# Patient Record
Sex: FEMALE | Race: BLACK OR AFRICAN AMERICAN | Employment: FULL TIME | URBAN - METROPOLITAN AREA
[De-identification: names, ages, dates, MRNs, and addresses within clinical notes are randomized per-mention and may not be internally consistent; named-entity substitution may affect disease eponyms.]

---

## 2017-04-18 ENCOUNTER — HOSPITAL ENCOUNTER (EMERGENCY)
Age: 32
Discharge: HOME | End: 2017-04-18
Attending: EMERGENCY MEDICINE
Payer: MEDICAID

## 2017-04-18 VITALS
DIASTOLIC BLOOD PRESSURE: 82 MMHG | SYSTOLIC BLOOD PRESSURE: 132 MMHG | OXYGEN SATURATION: 100 % | RESPIRATION RATE: 18 BRPM | HEART RATE: 66 BPM | TEMPERATURE: 98.5 F

## 2017-04-18 DIAGNOSIS — S46.912A SHOULDER STRAIN, LEFT, INITIAL ENCOUNTER: Primary | ICD-10-CM

## 2017-04-18 PROCEDURE — 99282 EMERGENCY DEPT VISIT SF MDM: CPT

## 2017-04-18 RX ORDER — IBUPROFEN 800 MG/1
TABLET ORAL
Qty: 20 TAB | Refills: 0 | Status: SHIPPED | OUTPATIENT
Start: 2017-04-18 | End: 2019-05-16

## 2017-04-18 NOTE — ED PROVIDER NOTES
HPI Comments: 7:22 AM Raghavendra Daniel is a 28 y.o. female with no pertinent medical history, presents to the ED with complaints of left shoulder pain  that began last night. She states that she believes she \"pulled a muscle\" while she was at work, since she does heavy lifting. She denies any fever, nausea, vomiting, diarrhea, cough, SOB, chest pain, back pain, and/or rash. She states that she can not raise her arm, and she has had this happen before approximately a year ago. She was informed a year ago that she had pulled a muscle then. She denies taking any pain medications. No further symptoms or complaints expressed at this time. The history is provided by the patient. Past Medical History:   Diagnosis Date    Anemia NEC     Postpartum depression     Suicidal thoughts        History reviewed. No pertinent surgical history. Family History:   Problem Relation Age of Onset    Kidney Disease Mother     Sickle Cell Anemia Brother        Social History     Social History    Marital status: SINGLE     Spouse name: N/A    Number of children: N/A    Years of education: N/A     Occupational History    Not on file. Social History Main Topics    Smoking status: Current Every Day Smoker     Packs/day: 1.00     Years: 10.00    Smokeless tobacco: Never Used    Alcohol use Yes      Comment: weekly    Drug use: No    Sexual activity: Yes     Partners: Male     Birth control/ protection: Condom     Other Topics Concern    Not on file     Social History Narrative         ALLERGIES: Review of patient's allergies indicates no known allergies. Review of Systems   Constitutional: Negative for fever. Respiratory: Negative for cough and shortness of breath. Cardiovascular: Negative for chest pain. Gastrointestinal: Negative for diarrhea, nausea and vomiting. Musculoskeletal: Positive for arthralgias (left shoulder pain). Negative for back pain. Skin: Negative for rash. All other systems reviewed and are negative. Vitals:    04/18/17 0634   BP: 132/82   Pulse: 66   Resp: 18   Temp: 98.5 °F (36.9 °C)   SpO2: 100%            Physical Exam   Constitutional: She is oriented to person, place, and time. She appears well-developed and well-nourished. No distress. HENT:   Head: Normocephalic and atraumatic. Right Ear: External ear normal.   Left Ear: External ear normal.   Nose: Nose normal.   Mouth/Throat: Uvula is midline, oropharynx is clear and moist and mucous membranes are normal.   Eyes: Conjunctivae are normal. No scleral icterus. Neck: Neck supple. Cardiovascular: Normal rate, regular rhythm, normal heart sounds and intact distal pulses. Pulmonary/Chest: Effort normal and breath sounds normal.   Abdominal: Soft. There is no tenderness. Musculoskeletal: She exhibits tenderness (Left shoulder mildly and diffusly tender to palpation with no effusion). She exhibits no edema. Neurological: She is alert and oriented to person, place, and time. Gait normal.   Skin: Skin is warm and dry. She is not diaphoretic. Psychiatric: Her behavior is normal.   Nursing note and vitals reviewed. [facility]  ED Course       Procedures      Medications ordered:   Medications - No data to display      Lab findings:  Labs Reviewed - No data to display    Progress notes, Consult notes or additional Procedure notes:   7:34 AM Patient is feeling well and will be discharged home with a referral for an orthopedic. Reevaluation of patient:   I have evaluated patient. Patient is feeling well. Dispo:  Patient was discharged in stable condition. Patient is to return to emergency department with any new or worsening condition.       Scribe Attestation:   I Leonel Colelo am scribing for and in the presence of Yin Dominguez MD on this day 04/18/17 at 7:34 AM   Issa Nichols    Provider Attestation:  Personally performed the services described in the documentation, reviewed the documentation, as recorded by the scribe in my presence, and it accurately and completely records my words and actions.   Mariposa Ortiz MD. 7:34 AM    Signed by: Issa Barrios, 04/18/17 , 7:34 AM

## 2017-04-18 NOTE — ED NOTES
Patient discharged home ambulatory in stable condition with family member. I have reviewed discharge instructions with the patient. The patient verbalized understanding. Patient discharged with one prescription, pt verbalized understanding of use. Patient armband removed and shredded.

## 2017-04-18 NOTE — LETTER
NOTIFICATION RETURN TO WORK / SCHOOL 
 
4/18/2017 7:43 AM 
 
Ms. Ap Lara Marydez 72 To Whom It May Concern: 
 
Ap Lara is currently under the care of [id number] [facility] EMERGENCY DEPT. She will return to work on: 4/20/17 If there are questions or concerns please have the patient contact our office.  
 
 
 
Sincerely, 
 
 
Tj Campbell MD

## 2017-04-18 NOTE — ED TRIAGE NOTES
Patient states she does heavy lifting at work and feels like she strained the muscles in her left lateral neck, down into her left shoulder.

## 2019-05-16 PROBLEM — O03.4 RETAINED PRODUCTS OF CONCEPTION AFTER MISCARRIAGE: Status: ACTIVE | Noted: 2019-05-16
